# Patient Record
Sex: MALE | Race: OTHER | URBAN - METROPOLITAN AREA
[De-identification: names, ages, dates, MRNs, and addresses within clinical notes are randomized per-mention and may not be internally consistent; named-entity substitution may affect disease eponyms.]

---

## 2020-11-26 ENCOUNTER — EMERGENCY (EMERGENCY)
Facility: HOSPITAL | Age: 40
LOS: 0 days | Discharge: HOME | End: 2020-11-27
Attending: EMERGENCY MEDICINE
Payer: OTHER MISCELLANEOUS

## 2020-11-26 VITALS
SYSTOLIC BLOOD PRESSURE: 147 MMHG | HEART RATE: 86 BPM | DIASTOLIC BLOOD PRESSURE: 85 MMHG | WEIGHT: 175.05 LBS | RESPIRATION RATE: 20 BRPM | HEIGHT: 67 IN | OXYGEN SATURATION: 96 % | TEMPERATURE: 99 F

## 2020-11-26 DIAGNOSIS — M20.011 MALLET FINGER OF RIGHT FINGER(S): ICD-10-CM

## 2020-11-26 DIAGNOSIS — M79.644 PAIN IN RIGHT FINGER(S): ICD-10-CM

## 2020-11-26 PROCEDURE — 73140 X-RAY EXAM OF FINGER(S): CPT | Mod: 26,RT

## 2020-11-26 PROCEDURE — 99283 EMERGENCY DEPT VISIT LOW MDM: CPT

## 2020-11-26 NOTE — ED PROVIDER NOTE - CARE PROVIDER_API CALL
Ashok Grimes  PLASTIC SURGERY  [address] [place] [address], [address]  [place], [place] [id number]  Phone: [phone]  Fax: [phone]  Follow Up Time: 1-3 Days
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
## 2020-11-26 NOTE — ED PROVIDER NOTE - PATIENT PORTAL LINK FT
You can access the FollowMyHealth Patient Portal offered by [facility] by registering at the following website: [email or web address]. By joining [employer]’s FollowMyHealth portal, you will also be able to view your health information using other applications (apps) compatible with our system.
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
## 2020-11-27 VITALS
SYSTOLIC BLOOD PRESSURE: 134 MMHG | HEART RATE: 72 BPM | OXYGEN SATURATION: 96 % | RESPIRATION RATE: 16 BRPM | DIASTOLIC BLOOD PRESSURE: 78 MMHG

## 2021-08-23 NOTE — ED PROVIDER NOTE - CLINICAL SUMMARY MEDICAL DECISION MAKING FREE TEXT BOX
Case Management Assessment  Initial Evaluation      Patient Name: Ria Marrero  YOB: 1997  Diagnosis: Chronic [facility] pancreatitis ([facility] [K86.0]  Date / Time: 8/22/2021  7:04 PM    Admission status/Date:  08/22/2021  Chart Reviewed: Yes      Patient Interviewed: Yes   Family Interviewed:  No      Hospitalization in the last 30 days:  No      Health Care Decision Maker :     (CM - must 1st enter selection under Navigator - emergency contact- Health Care Decision Maker Relationship and pick relationship)   Who do you trust or have selected to make healthcare decisions for you      Met with: patient  Paul Mast conducted  (bedside/phone): Phone    Current PCP: Needs PCP    Financial  uninsured- referral to [address] [facility] [employer] required for SNF :          3 night stay required -     ADLS  Support Systems/Care Needs: Family Members  Transportation: friend    Meal Preparation: self    Housing  Living Arrangements: Lives in [place], [place] Steps: NA  Intent for return to present living arrangements: Yes  Identified Issues: No prior ETOH tx     [address] with [address] : No Agency:(Services)  Type of Home Care Services: None  Passport/Waiver : No  :                      Phone Number:    Passport/Waiver Services: NA          Durable Medical Equiptment   DME Provider: MICHEAL  Equipment: NA  Walker___Cane___RTS___ BSC___Shower Chair___Hospital Bed___W/C____Other________  02 at ____Liter(s)---wears(frequency)_______ HHN ___ CPAP___ BiPap___   N/A____      Home O2 Use :  No        Community Service Affiliation  Dialysis:  No    · Agency:  · Location:  · Dialysis Schedule:  · Phone:   · Fax: Other Community Services: (ex:PT/OT,Mental Health,Wound Clinic, Cardio/Pul [address])    DISCHARGE PLAN: Explained Case Management role/services. Chart reviewed. Met with pt at bedside and explained the role of the CM. Plan: TBD.  Pt is open to talking to bedside peer support from [facility] for more information about OP tx. Referal called to [facility] [place] liaison. +CM following. 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  No fractures on xray.  Finger splint applied in slight hyperextension.  Patient given follow up to Dr. Grimes, hand surgery.  No other injuries.  Discussed results and outcome of today's visit with the patient.  Patient advised to please follow up with another healthcare provider within the next 24 hours and return to the Emergency Department for worsening symptoms or any other concerns.   Patient appears well on discharge.

## 2022-12-24 ENCOUNTER — EMERGENCY (EMERGENCY)
Facility: HOSPITAL | Age: 42
LOS: 1 days | Discharge: HOME | End: 2022-12-24
Attending: EMERGENCY MEDICINE
Payer: COMMERCIAL

## 2022-12-24 VITALS
HEART RATE: 67 BPM | HEIGHT: 66 IN | DIASTOLIC BLOOD PRESSURE: 110 MMHG | OXYGEN SATURATION: 96 % | WEIGHT: 175.05 LBS | TEMPERATURE: 98 F | SYSTOLIC BLOOD PRESSURE: 179 MMHG | RESPIRATION RATE: 20 BRPM

## 2022-12-24 VITALS
HEART RATE: 68 BPM | RESPIRATION RATE: 19 BRPM | DIASTOLIC BLOOD PRESSURE: 103 MMHG | SYSTOLIC BLOOD PRESSURE: 149 MMHG | OXYGEN SATURATION: 97 % | TEMPERATURE: 99 F

## 2022-12-24 PROCEDURE — 76377 3D RENDER W/INTRP POSTPROCES: CPT | Mod: 26

## 2022-12-24 PROCEDURE — 70486 CT MAXILLOFACIAL W/O DYE: CPT | Mod: 26,MA

## 2022-12-24 PROCEDURE — 99284 EMERGENCY DEPT VISIT MOD MDM: CPT | Mod: 25

## 2022-12-24 PROCEDURE — 70450 CT HEAD/BRAIN W/O DYE: CPT | Mod: 26,MA

## 2022-12-24 PROCEDURE — 99285 EMERGENCY DEPT VISIT HI MDM: CPT

## 2022-12-24 PROCEDURE — 70486 CT MAXILLOFACIAL W/O DYE: CPT | Mod: MA

## 2022-12-24 PROCEDURE — 70450 CT HEAD/BRAIN W/O DYE: CPT | Mod: MA

## 2022-12-24 PROCEDURE — 76377 3D RENDER W/INTRP POSTPROCES: CPT
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
## 2022-12-24 NOTE — ED PROVIDER NOTE - PATIENT PORTAL LINK FT
You can access the FollowMyHealth Patient Portal offered by [facility] by registering at the following website: [email or web address]. By joining [employer]’s FollowMyHealth portal, you will also be able to view your health information using other applications (apps) compatible with our system.

## 2022-12-24 NOTE — ED PROVIDER NOTE - PHYSICAL EXAMINATION
A&Ox3, NAD, well appearing  NCAT. PERRL, EOMI. No pain with EOM. EAC BL clear, TMs intact, + infraorbital rim ttp, mandible and maxilla otherwise nontender and stable. no loose or broken teeth, no tongue lacerations. No nasal bleeding or nasal septum hematoma.   Neck supple, no vertebral ttp of the c spine, c-spine with FAROM.   Skin: +Right periorbital ecchymosis and swelling of the infraorbital rim  No focal Deficits, Strength 5/5 UE/LE. Gait steady. A&Ox3, NAD, well appearing  NCAT. PERRL, EOMI. No pain with EOM. EAC BL clear, TMs intact, + infraorbital rim ttp, mandible and maxilla otherwise nontender and stable. no loose or broken teeth, no tongue lacerations. No nasal bleeding or nasal septum hematoma. VA 20/25 OU.  Neck supple, no vertebral ttp of the c spine, c-spine with FAROM.   Skin: +Right periorbital ecchymosis and swelling of the infraorbital rim  No focal Deficits, Strength 5/5 UE/LE. Gait steady.

## 2022-12-24 NOTE — ED PROVIDER NOTE - NS ED ATTENDING STATEMENT MOD
This was a shared visit with the IBIS. I reviewed and verified the documentation and independently performed the documented:

## 2022-12-24 NOTE — ED PROVIDER NOTE - NSFOLLOWUPINSTRUCTIONS_ED_ALL_ED_FT
- stay hydrated.   -ice the area for 20 min at a time  - take tylenol 975mg and ibuprofen 600mg every 6 hours as needed for pain-take with meals.  - follow up with your primary care provider this week  - return if symptoms worsen, have changes in vision, weakness, numbness/tingling, difficulty ambulating and all other concerns.

## 2024-02-29 PROBLEM — Z78.9 OTHER SPECIFIED HEALTH STATUS: Chronic | Status: ACTIVE | Noted: 2020-11-26
